# Patient Record
Sex: FEMALE | Race: BLACK OR AFRICAN AMERICAN | NOT HISPANIC OR LATINO | URBAN - METROPOLITAN AREA
[De-identification: names, ages, dates, MRNs, and addresses within clinical notes are randomized per-mention and may not be internally consistent; named-entity substitution may affect disease eponyms.]

---

## 2021-01-12 ENCOUNTER — OFFICE VISIT (OUTPATIENT)
Dept: URBAN - METROPOLITAN AREA CLINIC 40 | Facility: CLINIC | Age: 56
End: 2021-01-12
Payer: COMMERCIAL

## 2021-01-12 ENCOUNTER — WEB ENCOUNTER (OUTPATIENT)
Dept: URBAN - METROPOLITAN AREA CLINIC 40 | Facility: CLINIC | Age: 56
End: 2021-01-12

## 2021-01-12 ENCOUNTER — DASHBOARD ENCOUNTERS (OUTPATIENT)
Age: 56
End: 2021-01-12

## 2021-01-12 DIAGNOSIS — R14.0 BLOATING AND GAS: ICD-10-CM

## 2021-01-12 DIAGNOSIS — K59.01 CONSTIPATION: ICD-10-CM

## 2021-01-12 DIAGNOSIS — R19.4 CHANGE IN BOWEL HABITS: ICD-10-CM

## 2021-01-12 DIAGNOSIS — K63.5 SESSILE SERRATED ADENOMA COLONIC POLYP: ICD-10-CM

## 2021-01-12 DIAGNOSIS — R19.5 LOOSE STOOLS: ICD-10-CM

## 2021-01-12 PROBLEM — 14760008 CONSTIPATION: Status: ACTIVE | Noted: 2021-01-12

## 2021-01-12 PROCEDURE — 99204 OFFICE O/P NEW MOD 45 MIN: CPT | Performed by: PHYSICIAN ASSISTANT

## 2021-01-12 RX ORDER — SODIUM, POTASSIUM,MAG SULFATES 17.5-3.13G
ONCE SOLUTION, RECONSTITUTED, ORAL ORAL
Qty: 1 KIT | Refills: 0 | OUTPATIENT
Start: 2021-01-12 | End: 2021-01-13

## 2021-01-12 NOTE — HPI-TODAY'S VISIT:
Ms. Walter is a 55 year old black female who presents to the office, stating that in the last 1 month, she has had alternating stool with loose and hard stool.  She complains of excessive gas, change in bowel habits and diarrhea. Denies rectal bleeding or melena.  Denies significant abdominal pain but does have gas/bloat pain.  Was given omeprazole by her primary care provider but has no GERD symptoms, dyspepsia, nausea vomiting or dysphagia.  Fair appetite.  Admits that in the last few months she tried to increase her dietary fiber significantly eating a lot of broccoli, [place] sprouts and has significant gas.  No over-the-counter medications including simethicone seem to help.  She is not consistent with a probiotic therapy per se but states that she is drinking "probiotic tea".  She would like to schedule her surveillance colonoscopy.  Denies any changes in medications, use of antibiotics, stressors or anxiety.  Cannot think of any precipitating factors for change in bowel habits in the last 1 month.  Had her last physical with her primary care provider sometime late last year but now has a new primary care provider.  History of hysterectomy with ovaries remaining.  She has been monitoring her diet and cutting her carbohydrates to lose weight. Ms. Lyman had a colonoscopy with Dr. Ziegler on November 6, 2015.  At which time she was noted to have a 7 mm sessile serrated adenoma which was piecemeal biopsied from the ascending colon.  Internal hemorrhoids on exam.  It was recommended she follow-up in 1 year for repeat colonoscopy due to the piecemeal polypectomy.

## 2021-01-13 LAB
HEMATOCRIT: 42.8
HEMOGLOBIN: 13.4
MCH: 26.7
MCHC: 31.3
MCV: 85
NRBC: (no result)
PLATELETS: 190
RBC: 5.01
RDW: 14.2
T4,FREE(DIRECT): 1.06
TSH: 1.16
WBC: 5.8

## 2021-01-14 ENCOUNTER — OFFICE VISIT (OUTPATIENT)
Dept: URBAN - METROPOLITAN AREA SURGERY CENTER 30 | Facility: SURGERY CENTER | Age: 56
End: 2021-01-14

## 2021-02-02 ENCOUNTER — TELEPHONE ENCOUNTER (OUTPATIENT)
Dept: URBAN - METROPOLITAN AREA CLINIC 98 | Facility: CLINIC | Age: 56
End: 2021-02-02